# Patient Record
(demographics unavailable — no encounter records)

---

## 2025-05-20 NOTE — DATA REVIEWED
[Normal] : X-ray revealed no displaced fractures, dislocations or other abnormalities. [de-identified] : 4 views of scoliosis study reviewed.

## 2025-05-20 NOTE — DATA REVIEWED
[Normal] : X-ray revealed no displaced fractures, dislocations or other abnormalities. [de-identified] : 4 views of scoliosis study reviewed.

## 2025-05-20 NOTE — PHYSICAL EXAM
[Not Examined] : not examined [Normal] : The patient is moving all extremities spontaneously without any gross neurologic deficits. They walk with a fluid nonantalgic gait. There are equal and symmetric deep tendon reflexes in the upper and lower extremities bilaterally. There is gross intact sensation to soft and light touch in the bilateral upper and lower extremities [de-identified] : Pain with extension and internal rotation.

## 2025-05-20 NOTE — ASSESSMENT
[FreeTextEntry1] : Patient has slight spinal asymmetry. Patient is to go to physical therapy, learn exercises, and do them at home. Patient will follow up in 4 months.

## 2025-05-20 NOTE — PHYSICAL EXAM
[Not Examined] : not examined [Normal] : The patient is moving all extremities spontaneously without any gross neurologic deficits. They walk with a fluid nonantalgic gait. There are equal and symmetric deep tendon reflexes in the upper and lower extremities bilaterally. There is gross intact sensation to soft and light touch in the bilateral upper and lower extremities [de-identified] : Pain with extension and internal rotation.